# Patient Record
Sex: FEMALE | Race: OTHER | NOT HISPANIC OR LATINO | URBAN - METROPOLITAN AREA
[De-identification: names, ages, dates, MRNs, and addresses within clinical notes are randomized per-mention and may not be internally consistent; named-entity substitution may affect disease eponyms.]

---

## 2022-03-03 ENCOUNTER — OFFICE VISIT (OUTPATIENT)
Dept: URBAN - METROPOLITAN AREA CLINIC 33 | Facility: CLINIC | Age: 42
End: 2022-03-03
Payer: COMMERCIAL

## 2022-03-03 VITALS
WEIGHT: 135 LBS | HEART RATE: 71 BPM | DIASTOLIC BLOOD PRESSURE: 74 MMHG | BODY MASS INDEX: 26.5 KG/M2 | HEIGHT: 60 IN | OXYGEN SATURATION: 99 % | SYSTOLIC BLOOD PRESSURE: 118 MMHG

## 2022-03-03 DIAGNOSIS — R07.9 CHEST PAIN, UNSPECIFIED: ICD-10-CM

## 2022-03-03 DIAGNOSIS — K29.30 CHRONIC SUPERFICIAL GASTRITIS WITHOUT BLEEDING: ICD-10-CM

## 2022-03-03 DIAGNOSIS — K21.00 CHRONIC REFLUX ESOPHAGITIS: ICD-10-CM

## 2022-03-03 DIAGNOSIS — R13.10 DYSPHAGIA, UNSPECIFIED: ICD-10-CM

## 2022-03-03 PROCEDURE — 99244 OFF/OP CNSLTJ NEW/EST MOD 40: CPT | Performed by: INTERNAL MEDICINE

## 2022-03-03 PROCEDURE — 99204 OFFICE O/P NEW MOD 45 MIN: CPT | Performed by: INTERNAL MEDICINE

## 2022-03-03 RX ORDER — OMEPRAZOLE 20 MG/1
1 CAPSULE A NEEDED CAPSULE, DELAYED RELEASE ORAL ONCE A DAY
Qty: 90 CAPSULE | Refills: 1 | OUTPATIENT
Start: 2019-07-09

## 2022-03-03 RX ORDER — FAMOTIDINE 40 MG/1
1 TABLET AT BEDTIME TABLET, FILM COATED ORAL ONCE A DAY
Qty: 90 | Refills: 1 | OUTPATIENT
Start: 2022-03-03

## 2022-03-03 RX ORDER — ALBUTEROL SULFATE 90 UG/1
2 PUFFS AEROSOL, METERED RESPIRATORY (INHALATION) AS NEEDED
Status: ACTIVE | COMMUNITY

## 2022-03-03 RX ORDER — HYDROXYCHLOROQUINE SULFATE 200 MG/1
2 TABLETS WITH FOOD OR MILK TABLET ORAL ONCE A DAY
Status: ACTIVE | COMMUNITY

## 2022-03-03 RX ORDER — MONTELUKAST SODIUM 10 MG/1
1 TABLET IN THE EVENING TABLET ORAL ONCE A DAY
Status: ACTIVE | COMMUNITY

## 2022-03-03 RX ORDER — RANITIDINE 150 MG/1
1 TABLET AT BEDTIME TABLET, FILM COATED ORAL ONCE A DAY
Qty: 90 TABLET | Refills: 1 | Status: DISCONTINUED | COMMUNITY
Start: 2018-09-24

## 2022-03-03 RX ORDER — NAPROXEN SODIUM 220 MG/1
1 TABLET WITH FOOD OR MILK AS NEEDED TABLET ORAL
Status: ACTIVE | COMMUNITY

## 2022-03-03 RX ORDER — IBUPROFEN SODIUM 256 MG/1
1 TABLET WITH FOOD OR MILK AS NEEDED TABLET, COATED ORAL THREE TIMES A DAY
Status: ACTIVE | COMMUNITY

## 2022-03-03 RX ORDER — TIOTROPIUM BROMIDE 18 UG/1
CAPSULE ORAL; RESPIRATORY (INHALATION) AS NEEDED
Status: DISCONTINUED | COMMUNITY

## 2022-03-03 RX ORDER — ANTIARTHRITIC COMBINATION NO.2 900 MG
TABLET ORAL
Status: DISCONTINUED | COMMUNITY

## 2022-03-03 RX ORDER — OMEPRAZOLE 20 MG/1
1 CAPSULE CAPSULE, DELAYED RELEASE ORAL ONCE A DAY
Qty: 90 CAPSULE | Refills: 0 | Status: ACTIVE | COMMUNITY
Start: 2019-07-09

## 2022-03-03 NOTE — HPI-GERD
42 y/o female presents today for a consultation for history of  GERD.  She has been taking Omeprazole 20 mg 1 QD to QOD since (2018) with relief.  Symptoms worse if she does not take Omeprazole for 3 days.  Consuming White Wine, tomato base food and coffee has been an aggravating factor.  Symptoms include retrosternal burning,  occasional sour eructations, sensation of esophageal stricture, a discomfort/tightness bilateral to mid chest.    Patient had an EGD with [facility] completed 9/26/2016.

## 2022-03-03 NOTE — HPI-DYSPHAGIA
Admits random episodes of dysphagia over the past year.  Described as a sensation of esophageal stricture and feeling of food getting stuck in her esophagus.  Consuming Steak has been an aggravating factor.  Drining liquids to assist swallowing help.

## 2022-06-09 ENCOUNTER — OFFICE VISIT (OUTPATIENT)
Dept: URBAN - METROPOLITAN AREA CLINIC 33 | Facility: CLINIC | Age: 42
End: 2022-06-09

## 2022-06-09 RX ORDER — IBUPROFEN SODIUM 256 MG/1
1 TABLET WITH FOOD OR MILK AS NEEDED TABLET, COATED ORAL THREE TIMES A DAY
Status: ACTIVE | COMMUNITY

## 2022-06-09 RX ORDER — HYDROXYCHLOROQUINE SULFATE 200 MG/1
2 TABLETS WITH FOOD OR MILK TABLET ORAL ONCE A DAY
Status: ACTIVE | COMMUNITY

## 2022-06-09 RX ORDER — MONTELUKAST SODIUM 10 MG/1
1 TABLET IN THE EVENING TABLET ORAL ONCE A DAY
Status: ACTIVE | COMMUNITY

## 2022-06-09 RX ORDER — FAMOTIDINE 40 MG/1
1 TABLET AT BEDTIME TABLET, FILM COATED ORAL ONCE A DAY
Qty: 90 | Refills: 1 | Status: ACTIVE | COMMUNITY
Start: 2022-03-03

## 2022-06-09 RX ORDER — FAMOTIDINE 40 MG/1
1 TABLET AT BEDTIME TABLET, FILM COATED ORAL ONCE A DAY
Qty: 90 | Refills: 1 | OUTPATIENT

## 2022-06-09 RX ORDER — OMEPRAZOLE 20 MG/1
1 CAPSULE A NEEDED CAPSULE, DELAYED RELEASE ORAL ONCE A DAY
Qty: 90 CAPSULE | Refills: 1 | Status: ACTIVE | COMMUNITY
Start: 2019-07-09

## 2022-06-09 RX ORDER — ALBUTEROL SULFATE 90 UG/1
2 PUFFS AEROSOL, METERED RESPIRATORY (INHALATION) AS NEEDED
Status: ACTIVE | COMMUNITY

## 2022-06-09 RX ORDER — OMEPRAZOLE 20 MG/1
1 CAPSULE A NEEDED CAPSULE, DELAYED RELEASE ORAL ONCE A DAY
Qty: 90 CAPSULE | Refills: 1 | OUTPATIENT

## 2022-06-09 RX ORDER — NAPROXEN SODIUM 220 MG/1
1 TABLET WITH FOOD OR MILK AS NEEDED TABLET ORAL
Status: ACTIVE | COMMUNITY

## 2022-06-09 NOTE — HPI-DYSPHAGIA
She admits/denies improvement in episodes of dysphagia since her last visit. Patient reports ----    Last visit (3/3/2022)  Admits random episodes of dysphagia over the past year.  Described as a sensation of esophageal stricture and feeling of food getting stuck in her esophagus.  Consuming Steak has been an aggravating factor.  Drining liquids to assist swallowing help.

## 2022-06-09 NOTE — HPI-GERD
Patient presents today for a 3 month follow up for acid reflux. She admits/denies the continued use of Famotidine 40 mg  at bedtime and Omeprazole 20 mg 1 PO QD does/not control her symptoms.   She admits/denies any associated symptoms at this time.   Last visit (3/3/2022)  42 y/o female presents today for a consultation for history of  GERD.  She has been taking Omeprazole 20 mg 1 QD to QOD since (2018) with relief.  Symptoms worse if she does not take Omeprazole for 3 days.  Consuming White Wine, tomato base food and coffee has been an aggravating factor.  Symptoms include retrosternal burning,  occasional sour eructations, sensation of esophageal stricture, a discomfort/tightness bilateral to mid chest.    Patient had an EGD with [facility] completed 9/26/2016.

## 2022-06-16 ENCOUNTER — OFFICE VISIT (OUTPATIENT)
Dept: URBAN - METROPOLITAN AREA CLINIC 33 | Facility: CLINIC | Age: 42
End: 2022-06-16

## 2022-06-16 ENCOUNTER — TELEPHONE ENCOUNTER (OUTPATIENT)
Dept: URBAN - METROPOLITAN AREA CLINIC 35 | Facility: CLINIC | Age: 42
End: 2022-06-16

## 2022-06-16 RX ORDER — OMEPRAZOLE 20 MG/1
1 CAPSULE A NEEDED CAPSULE, DELAYED RELEASE ORAL ONCE A DAY
Qty: 90 CAPSULE | Refills: 1 | Status: ACTIVE | COMMUNITY

## 2022-06-16 RX ORDER — HYDROXYCHLOROQUINE SULFATE 200 MG/1
2 TABLETS WITH FOOD OR MILK TABLET ORAL ONCE A DAY
Status: ACTIVE | COMMUNITY

## 2022-06-16 RX ORDER — ALBUTEROL SULFATE 90 UG/1
2 PUFFS AEROSOL, METERED RESPIRATORY (INHALATION) AS NEEDED
Status: ACTIVE | COMMUNITY

## 2022-06-16 RX ORDER — OMEPRAZOLE 20 MG/1
1 CAPSULE A NEEDED CAPSULE, DELAYED RELEASE ORAL ONCE A DAY
Qty: 90 CAPSULE | Refills: 1 | OUTPATIENT

## 2022-06-16 RX ORDER — FAMOTIDINE 40 MG/1
1 TABLET AT BEDTIME TABLET, FILM COATED ORAL ONCE A DAY
Qty: 90 | Refills: 1 | OUTPATIENT

## 2022-06-16 RX ORDER — IBUPROFEN SODIUM 256 MG/1
1 TABLET WITH FOOD OR MILK AS NEEDED TABLET, COATED ORAL THREE TIMES A DAY
Status: ACTIVE | COMMUNITY

## 2022-06-16 RX ORDER — MONTELUKAST SODIUM 10 MG/1
1 TABLET IN THE EVENING TABLET ORAL ONCE A DAY
Status: ACTIVE | COMMUNITY

## 2022-06-16 RX ORDER — FAMOTIDINE 40 MG/1
1 TABLET AT BEDTIME TABLET, FILM COATED ORAL ONCE A DAY
Qty: 90 | Refills: 1 | Status: ACTIVE | COMMUNITY

## 2022-06-16 RX ORDER — NAPROXEN SODIUM 220 MG/1
1 TABLET WITH FOOD OR MILK AS NEEDED TABLET ORAL
Status: ACTIVE | COMMUNITY

## 2022-06-22 ENCOUNTER — TELEPHONE ENCOUNTER (OUTPATIENT)
Dept: URBAN - METROPOLITAN AREA CLINIC 35 | Facility: CLINIC | Age: 42
End: 2022-06-22

## 2022-06-22 RX ORDER — FAMOTIDINE 40 MG/1
1 TABLET AT BEDTIME TABLET, FILM COATED ORAL ONCE A DAY
Qty: 90 TABLET | Refills: 0

## 2022-07-21 ENCOUNTER — DASHBOARD ENCOUNTERS (OUTPATIENT)
Age: 42
End: 2022-07-21

## 2022-07-21 ENCOUNTER — OFFICE VISIT (OUTPATIENT)
Dept: URBAN - METROPOLITAN AREA CLINIC 33 | Facility: CLINIC | Age: 42
End: 2022-07-21
Payer: COMMERCIAL

## 2022-07-21 VITALS
SYSTOLIC BLOOD PRESSURE: 118 MMHG | DIASTOLIC BLOOD PRESSURE: 72 MMHG | OXYGEN SATURATION: 98 % | BODY MASS INDEX: 26.31 KG/M2 | WEIGHT: 134 LBS | HEIGHT: 60 IN | HEART RATE: 74 BPM

## 2022-07-21 DIAGNOSIS — R13.10 DYSPHAGIA, UNSPECIFIED: ICD-10-CM

## 2022-07-21 DIAGNOSIS — K29.30 CHRONIC SUPERFICIAL GASTRITIS WITHOUT BLEEDING: ICD-10-CM

## 2022-07-21 DIAGNOSIS — K21.9 ACID REFLUX: ICD-10-CM

## 2022-07-21 DIAGNOSIS — R12 HEARTBURN: ICD-10-CM

## 2022-07-21 PROCEDURE — 99213 OFFICE O/P EST LOW 20 MIN: CPT | Performed by: INTERNAL MEDICINE

## 2022-07-21 RX ORDER — NAPROXEN SODIUM 220 MG/1
1 TABLET WITH FOOD OR MILK AS NEEDED TABLET ORAL
Status: ACTIVE | COMMUNITY

## 2022-07-21 RX ORDER — FAMOTIDINE 40 MG/1
1 TABLET AT BEDTIME TABLET, FILM COATED ORAL ONCE A DAY
Qty: 90 TABLET | Refills: 0

## 2022-07-21 RX ORDER — MONTELUKAST SODIUM 10 MG/1
1 TABLET IN THE EVENING TABLET ORAL ONCE A DAY
Status: ACTIVE | COMMUNITY

## 2022-07-21 RX ORDER — IBUPROFEN SODIUM 256 MG/1
1 TABLET WITH FOOD OR MILK AS NEEDED TABLET, COATED ORAL THREE TIMES A DAY
Status: ACTIVE | COMMUNITY

## 2022-07-21 RX ORDER — FAMOTIDINE 40 MG/1
1 TABLET AT BEDTIME TABLET, FILM COATED ORAL ONCE A DAY
Qty: 90 TABLET | Refills: 0 | Status: ACTIVE | COMMUNITY

## 2022-07-21 RX ORDER — OMEPRAZOLE 20 MG/1
1 CAPSULE AS NEEDED CAPSULE, DELAYED RELEASE ORAL ONCE A DAY
Qty: 90 CAPSULE | Refills: 3 | OUTPATIENT

## 2022-07-21 RX ORDER — OMEPRAZOLE 20 MG/1
1 CAPSULE A NEEDED CAPSULE, DELAYED RELEASE ORAL ONCE A DAY
Qty: 90 CAPSULE | Refills: 1 | Status: ACTIVE | COMMUNITY

## 2022-07-21 RX ORDER — HYDROXYCHLOROQUINE SULFATE 200 MG/1
2 TABLETS WITH FOOD OR MILK TABLET ORAL ONCE A DAY
Status: ACTIVE | COMMUNITY

## 2022-07-21 RX ORDER — ALBUTEROL SULFATE 90 UG/1
2 PUFFS AEROSOL, METERED RESPIRATORY (INHALATION) AS NEEDED
Status: ACTIVE | COMMUNITY

## 2022-07-21 NOTE — HPI-DYSPHAGIA
She has rare  episodes of dysphagia since her last visit.     Last visit (3/3/2022)  Admits random episodes of dysphagia over the past year.  Described as a sensation of esophageal stricture and feeling of food getting stuck in her esophagus.  Consuming Steak has been an aggravating factor.  Drining liquids to assist swallowing help.

## 2022-07-21 NOTE — HPI-GERD
Patient presents today for a 3-month follow up for acid reflux.  She admits the continued use of Famotidine 40 mg  at bedtime, but not routinely and Omeprazole 20 mg 1 QD 3-4  times a week with relief of symptoms.   Consuming tomato base food, orange juice and White Wine and other alcohol.  She has rare episodes of  chest pain   Last visit (3/3/2022)  40 y/o female presents today for a consultation for history of  GERD.  She has been taking Omeprazole 20 mg 1 QD to QOD since (2018) with relief.  Symptoms worse if she does not take Omeprazole for 3 days.  Consuming White Wine, tomato base food and coffee has been an aggravating factor.  Symptoms include retrosternal burning,  occasional sour eructations, sensation of esophageal stricture, a discomfort/tightness bilateral to mid chest.    Patient had an EGD with [facility] completed 9/26/2016.

## 2023-07-24 ENCOUNTER — OFFICE VISIT (OUTPATIENT)
Dept: URBAN - METROPOLITAN AREA CLINIC 33 | Facility: CLINIC | Age: 43
End: 2023-07-24